# Patient Record
Sex: MALE | Employment: FULL TIME
[De-identification: names, ages, dates, MRNs, and addresses within clinical notes are randomized per-mention and may not be internally consistent; named-entity substitution may affect disease eponyms.]

---

## 2020-09-18 ENCOUNTER — EMPLOYEE WELLNESS (OUTPATIENT)
Dept: OTHER | Facility: CLINIC | Age: 25
End: 2020-09-18

## 2020-09-18 LAB
CHOLEST SERPL-MCNC: 246 MG/DL
GLUCOSE SERPL-MCNC: 68 MG/DL (ref 65–100)
HDLC SERPL-MCNC: 46 MG/DL
LDLC SERPL CALC-MCNC: 173 MG/DL (ref 0–100)
TRIGL SERPL-MCNC: 135 MG/DL (ref ?–150)

## 2020-09-28 ENCOUNTER — PATIENT OUTREACH (OUTPATIENT)
Dept: OTHER | Age: 25
End: 2020-09-28

## 2020-09-29 ENCOUNTER — PATIENT OUTREACH (OUTPATIENT)
Dept: OTHER | Age: 25
End: 2020-09-29

## 2020-09-29 NOTE — LETTER
9/29/2020 12:46 PM 
 
Mr. Keanu Robertson [address] [facility] [phone] Dear Keanu Robertson My name is Luz Pizarro, Associate Care Coordinator for [facility] and I have been trying to reach you. The Associate Care Management (ACM) program is a free-of-charge confidential service provided to our associates and their family members covered by the [facility]. The program will provide an associate and his/her family with the [facility] expertise to assist in navigating the health care delivery system, provider services, and their overall care needsso as to assure and improve health care interactions and enhance the quality of life. This program is designed to provide you with the opportunity to have a [facility] FOR [facility] partner with you for the following services: 
 
 1) when you come home from the hospital or emergency room 2) when help is needed to manage your disease 3) when you need assistance coordinating services or appointments 4) when you need additional education, resources or assistance reaching your Be Well Health Program goals/requirements such as Be Well With Diabetes [facility] is dedicated to empowering the good health of its community and improving the quality of care and care experiences for associates and their families. We are committed to safeguarding patient confidentiality and privacy, assuring that every associate has the respect he or she deserves in managing their health. The information shared with your care manager will not be shared with anyone else aside from those you identify as part of your care team, and will only be used to assist you with any identified care needs. Please contact me if you would like this service provided to you. Sincerely, 
 
 
Lacie Palacios LPN  [place] [facility] Care Coordinator [facility] 
[address], [address] [place] [address] C [phone]  F [phone]  [email or web address]

## 2020-09-29 NOTE — PROGRESS NOTES
Second telephone outreach attemptedto contact patient to offer adSage. Left discreet voicemail with this CM confidential contact information. Will send UTR letter via Mail. Next Outreach 10/13/20 f/u ? PC, CM Services.

## 2020-10-13 ENCOUNTER — PATIENT OUTREACH (OUTPATIENT)
Dept: OTHER | Age: 25
End: 2020-10-13

## 2020-10-13 NOTE — PROGRESS NOTES
Patient referred by Dr. Derick Lopez after Be Well screening results. Final call made today to attempt to contact patient. Left discreet message on voicemail with this CC contact information. Letter sent to patient notifying completion of services due to unable to reach.

## 2020-10-13 NOTE — LETTER
10/13/2020 9:44 AM 
 
Mr. Mery Smith [address] [facility] [phone] Dear Mery Smith My name is Malgorzata Montalvo,  Associate Care Coordinator for [facility] [place] [facility] AT [place], and I have been trying to reach you. The Associate Care Management (ACM) program is a free-of-charge, confidential service provided to our employees and their family members covered by the [facility]. I can help you with care transitions such as when you come home from the hospital, when help is needed to manage your disease, or when you need assistance coordinating services or appointments. As healthcare providers, we know that patients do better when they have close follow up with a primary care provider (PCP). I can help you find one that is convenient to you and covered by your insurance. I can also help you understand any after visit instructions, such as what symptoms to watch out for, or any new or changed medications. We can work together using your preferred communication -- telephone, email, [employer]. If you do not have a [employer] account, I can help you request access. Our program is designed to provide you with the opportunity to have a [facility] care manager partner with you for your healthcare needs. Due to not being able to reach you, I am closing out the current program, but will remain available to you should you have any questions. Please contact me at the below number if I can provide you with assistance for any of the above services. Sincerely, 
 
Brenda Joshi LPN  [place] [facility] Care Coordinator [facility] [place] [facility] AT [address], [address] [place] [address] C [phone]  F [phone]  [email or web address]

## 2021-01-08 ENCOUNTER — OFFICE VISIT (OUTPATIENT)
Dept: INTERNAL MEDICINE CLINIC | Age: 26
End: 2021-01-08
Payer: COMMERCIAL

## 2021-01-08 VITALS
HEART RATE: 78 BPM | OXYGEN SATURATION: 98 % | SYSTOLIC BLOOD PRESSURE: 96 MMHG | HEIGHT: 65 IN | BODY MASS INDEX: 28.29 KG/M2 | TEMPERATURE: 98.9 F | RESPIRATION RATE: 18 BRPM | WEIGHT: 169.8 LBS | DIASTOLIC BLOOD PRESSURE: 62 MMHG

## 2021-01-08 DIAGNOSIS — B20 HUMAN IMMUNODEFICIENCY VIRUS (HIV) DISEASE (HCC): Primary | ICD-10-CM

## 2021-01-08 PROCEDURE — 99204 OFFICE O/P NEW MOD 45 MIN: CPT | Performed by: INTERNAL MEDICINE

## 2021-01-08 RX ORDER — FLUOXETINE HYDROCHLORIDE 20 MG/1
CAPSULE ORAL
COMMUNITY
Start: 2020-10-25

## 2021-01-08 RX ORDER — GLUCOSAMINE/CHONDR SU A SOD 750-600 MG
TABLET ORAL
COMMUNITY

## 2021-01-08 RX ORDER — BISMUTH SUBSALICYLATE 262 MG
1 TABLET,CHEWABLE ORAL DAILY
COMMUNITY

## 2021-01-08 NOTE — PROGRESS NOTES
Infectious Disease Consult Note    Reason for Consult: PrEP  Date of Consultation: January 8, 2021  Date of Admission: (Not on file)  Referring Physician: patient self referred as he moved to [facility]       HPI:  Mehrdad Hampton is a 26y.o. year old male with history of SARS-CoV-2 infection in November 2020, depression, appendectomy who presents for discussion for PrEP. Patient states that he had been on Truvada for almost a year in the past. . He used to see a different infectious disease doctor in [place] at that [facility] where his PCP was. He reports moving to the area after college and wanting to establish care here. 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 Patient denies any symptoms today other than feeling a little achy status post Covid vaccine, first dose yesterday. Regards to sexual history, he reports having 3-4 male partners who are HIV negative. He denies any female partners. He admits to using protection with condoms. He does admit to engaging in anorectal intercourse and is a recipient. He denies any sexually transmitted diseases to his knowledge including HIV, syphilis, gonorrhea, chlamydia. He denies known history of hepatitis A,B or C. He denies any side effects or tolerance issues while taking Truvada.     He says he used to be compliant while taking Truvada as he also takes Prozac for depression        Past Medical History:  Past Medical History:   Diagnosis Date    Depression      COVID-19 November 2020    Surgical History:  Past Surgical History:   Procedure Laterality Date    HX APPENDECTOMY      HX HEENT           Family History:   Family History   Problem Relation Age of Onset    Diabetes Mother     No Known Problems Father          Social History:     · Living Situation: Works as a ER tech and also on EMS  · Tobacco: Denied cigarettes, admits to smoking marijuana  · Alcohol: Denied  · Illicit Drugs: Denied any other inhalation substance abuse or IV drug abuse  · Sexual History: Positive male partners, multiple  · Travel History denied  · Exposures:   · Outdoor/Hiking: denied  · Animal/Pet: Has a dog, cat, lizard and fish  · Construction: denied  · Hot Tub: denied   · Brackish/stagnant exposure: denied  · Known TB exposure: denied  Allergies: Allergies   Allergen Reactions    Keflex [Cephalexin] Rash    Rocephin [Ceftriaxone] Rash         Review of Systems:    10 point review of systems obtained and per HPI. All others negative. Medications:  Current Outpatient Medications on File Prior to Visit   Medication Sig Dispense Refill    FLUoxetine (PROzac) 20 mg capsule TAKE 1 CAPSULE BY MOUTH EVERY DAY      Biotin 2,500 mcg cap Take  by mouth.  multivitamin (ONE A DAY) tablet Take 1 Tab by mouth daily. No current facility-administered medications on file prior to visit.             Physical Exam:    · Vitals: Reviewed  · GEN: NAD  · HEENT: Normocephalic, atraumatic, PERRL, no scleral icterus,  no cervical, no lymphadenopathy, no sinus tenderness, no thrush  · CV: S1, S2 heard regularly, no murmurs, thrills or rubs heard   · Lungs: Clear to auscultation bilaterally  · Abdomen: soft, non distended, non tender, no organomegaly appreciated, no CVA tenderness   · Genitourinary: no genital discharge, no pruitt  · Extremities: no edema  · Neuro: Alert, oriented to time, place and situation, moves all extremities to commands, verbal   · Skin: no rash  · Psych: good affect, good eye contact, non tearful   · Musculoskeletal nontender to palpation of knees ankles wrists      Labs:   None available at this time review  Microbiology Data:   None available at this time to review    Pathology Results: None available at this time to review    Imaging: None available at this time to review        Assessment / Plan:       26y.o. year old male with history of SARS-CoV-2 infection in November 2020, depression, appendectomy who presents for discussion for PrEP.  Patient reports being on PrEP with Truvada in the past  His risk factor for HIV acquisition are multiple male partners  He reports using consistent condom use  Denies a history of sexually transmitted infection in the past    1) PREP   Discussed regarding routes and risk for HIV acquisition  Discussed regarding partner testing/disclosure notification  Discussed regarding options that include Truvada vs Descovy  Discussed regarding better renal and bone side effect profile with Descovy compared to Truvada  However Descovy has been associated (TAF-FTC) with mild but greater weight gain and changes in lipid parameters compared with TDF in Truvada  Will first need to assess labs to ensure renal function has GFR greater than 30 for either use of Truvada or Descovy  Discussed regarding compliance that is necessary and follow-up of labs and pill compliance  Patient is willing to have the following labs today and also have records sent from his previous infectious disease doctors office    Plan  Check CBC wt diff, CMP  Check HIV 4th gen and HIV PCR  Hep A, B C serologies  Chlamydia , gonorrhea PCR  Check RPR    Once above labs obtained and resulted, will discuss with patient regarding PrEP again to prescri depression history of Prozacbe    2) depression history on Prozac    3) Hx of COVID November 2020  Denies any respiratory symptoms today  Status post Covid vaccination ( 1st dose  yesterday per patient)    4) RTC 3 months

## 2021-01-08 NOTE — PROGRESS NOTES
Health Maintenance Due   Topic Date Due    DTaP/Tdap/Td series (1 - Tdap) 06/08/2017    Flu Vaccine (1) 09/01/2020       Chief Complaint   Patient presents with    New Patient     covid + november 2020       1. Have you been to the ER, urgent care clinic since your last visit? Hospitalized since your last visit? No    2. Have you seen or consulted any other health care providers outside of the [address] [place] [address] since your last visit? Include any pap smears or colon screening. No    3) Do you have an Advance Directive on file? no    4) Are you interested in receiving information on Advance Directives? NO      Patient is accompanied by self I have received verbal consent from Keanu Lizama to discuss any/all medical information while they are present in the room.

## 2021-01-10 LAB
ALBUMIN SERPL-MCNC: 4.5 G/DL (ref 4.1–5.2)
ALBUMIN/GLOB SERPL: 1.7 {RATIO} (ref 1.2–2.2)
ALP SERPL-CCNC: 60 IU/L (ref 39–117)
ALT SERPL-CCNC: 15 IU/L (ref 0–44)
AST SERPL-CCNC: 19 IU/L (ref 0–40)
BASOPHILS # BLD AUTO: 0 X10E3/UL (ref 0–0.2)
BASOPHILS NFR BLD AUTO: 1 %
BILIRUB SERPL-MCNC: 0.8 MG/DL (ref 0–1.2)
BUN SERPL-MCNC: 13 MG/DL (ref 6–20)
BUN/CREAT SERPL: 16 (ref 9–20)
C TRACH RRNA SPEC QL NAA+PROBE: NEGATIVE
CALCIUM SERPL-MCNC: 9.5 MG/DL (ref 8.7–10.2)
CHLORIDE SERPL-SCNC: 102 MMOL/L (ref 96–106)
CO2 SERPL-SCNC: 23 MMOL/L (ref 20–29)
CREAT SERPL-MCNC: 0.79 MG/DL (ref 0.76–1.27)
EOSINOPHIL # BLD AUTO: 0 X10E3/UL (ref 0–0.4)
EOSINOPHIL NFR BLD AUTO: 0 %
ERYTHROCYTE [DISTWIDTH] IN BLOOD BY AUTOMATED COUNT: 12.8 % (ref 11.6–15.4)
GLOBULIN SER CALC-MCNC: 2.7 G/DL (ref 1.5–4.5)
GLUCOSE SERPL-MCNC: 98 MG/DL (ref 65–99)
HAV AB SER QL IA: POSITIVE
HAV IGM SERPL QL IA: NEGATIVE
HBV CORE AB SERPL QL IA: NEGATIVE
HBV SURFACE AB SER QL: REACTIVE
HBV SURFACE AG SERPL QL IA: NEGATIVE
HCT VFR BLD AUTO: 43.9 % (ref 37.5–51)
HCV AB S/CO SERPL IA: <0.1 S/CO RATIO (ref 0–0.9)
HGB BLD-MCNC: 14.5 G/DL (ref 13–17.7)
HIV 1+2 AB+HIV1 P24 AG SERPL QL IA: NON REACTIVE
HIV1 RNA # SERPL NAA+PROBE: <20 COPIES/ML
HIV1 RNA SERPL NAA+PROBE-LOG#: NORMAL LOG10COPY/ML
IMM GRANULOCYTES # BLD AUTO: 0 X10E3/UL (ref 0–0.1)
IMM GRANULOCYTES NFR BLD AUTO: 0 %
LYMPHOCYTES # BLD AUTO: 0.5 X10E3/UL (ref 0.7–3.1)
LYMPHOCYTES NFR BLD AUTO: 7 %
MCH RBC QN AUTO: 30.9 PG (ref 26.6–33)
MCHC RBC AUTO-ENTMCNC: 33 G/DL (ref 31.5–35.7)
MCV RBC AUTO: 93 FL (ref 79–97)
MONOCYTES # BLD AUTO: 0.6 X10E3/UL (ref 0.1–0.9)
MONOCYTES NFR BLD AUTO: 7 %
N GONORRHOEA RRNA SPEC QL NAA+PROBE: NEGATIVE
NEUTROPHILS # BLD AUTO: 6.8 X10E3/UL (ref 1.4–7)
NEUTROPHILS NFR BLD AUTO: 85 %
PLATELET # BLD AUTO: 273 X10E3/UL (ref 150–450)
POTASSIUM SERPL-SCNC: 4.2 MMOL/L (ref 3.5–5.2)
PROT SERPL-MCNC: 7.2 G/DL (ref 6–8.5)
RBC # BLD AUTO: 4.7 X10E6/UL (ref 4.14–5.8)
RPR SER QL: NON REACTIVE
SODIUM SERPL-SCNC: 139 MMOL/L (ref 134–144)
WBC # BLD AUTO: 8 X10E3/UL (ref 3.4–10.8)

## 2021-01-14 ENCOUNTER — DOCUMENTATION ONLY (OUTPATIENT)
Dept: INFECTIOUS DISEASES | Age: 26
End: 2021-01-14

## 2021-01-14 NOTE — PROGRESS NOTES
ID addendum    Mr Praveena Curry dropped off some records that were reviewed and summarized below    The date of this testing is not available at present but papers he dropped off  [facility] and date printed 11/13/20    Cr 1.01. nim 11  Treponema pallidum IGG +IGM non reactive  HIv ab and ag EIA NR ( resulted 1/2/20)    Collected 12/27/19  C trachomatis RNA negative   GC RNA negative    Collected 2/28/20  HSV IGG +  HSV IGM negative

## 2021-02-17 DIAGNOSIS — Z79.899 ON PRE-EXPOSURE PROPHYLAXIS FOR HIV: Primary | ICD-10-CM

## 2021-02-17 RX ORDER — EMTRICITABINE AND TENOFOVIR DISOPROXIL FUMARATE 200; 300 MG/1; MG/1
1 TABLET, FILM COATED ORAL DAILY
Qty: 30 TAB | Refills: 3 | Status: SHIPPED | OUTPATIENT
Start: 2021-02-17

## 2021-02-17 NOTE — PROGRESS NOTES
D/W with Mr Sarah Haynes by phone today  He denies being sexually active or any symptoms since his last visit  Labs reviewed from January 2021    RPR nonreactive  Hepatitis C antibody less than 0.1  HIV fourth-generation screen nonreactive  Hepatitis B surface antibody reactive  Hepatitis A IgM negative and hepatitis A antibody total positive  Gonorrhea and Chlamydia negative PCR  HIV RNA by PCR less than 20  Hepatitis B core antibody total negative  Hepatitis B surface antigen negative  Creatinine 0.79 BUN 13     Discussed with Mr. Sarah Haynes regarding pros and cons of Descovy versus Truvada for preexposure prophylaxis    Discussed regarding potential of mild greater weight gain with Descovy and lipid changes compared to Truvada    Discussed regarding Truvada as potential of causing bone and renal side effects    Patient states he has been on Truvada before and done well    He is concerned more about the lipids and would like to restart Truvada for preexposure prophylaxis    Would still continue safe sex, condom use    Discussed regarding compliance necessary every 3 months for blood work as well as refills    Follow-up in 3 months    We will check a BMP in 2 weeks after starting Truvada    Counseled regarding potential side effects of Truvada and to call us if he experiences any side effects including GI, rash or any other adverse effects    Mera Stephenson,   12:15 PM

## 2021-03-03 DIAGNOSIS — Z79.899 ON PRE-EXPOSURE PROPHYLAXIS FOR HIV: ICD-10-CM

## 2021-03-04 LAB
BUN SERPL-MCNC: 14 MG/DL (ref 6–20)
BUN/CREAT SERPL: 16 (ref 9–20)
CALCIUM SERPL-MCNC: 9.7 MG/DL (ref 8.7–10.2)
CHLORIDE SERPL-SCNC: 103 MMOL/L (ref 96–106)
CO2 SERPL-SCNC: 22 MMOL/L (ref 20–29)
CREAT SERPL-MCNC: 0.87 MG/DL (ref 0.76–1.27)
GLUCOSE SERPL-MCNC: 92 MG/DL (ref 65–99)
POTASSIUM SERPL-SCNC: 4.2 MMOL/L (ref 3.5–5.2)
SODIUM SERPL-SCNC: 140 MMOL/L (ref 134–144)

## 2021-05-04 DIAGNOSIS — B20 HUMAN IMMUNODEFICIENCY VIRUS (HIV) DISEASE (HCC): Primary | ICD-10-CM

## 2021-05-04 RX ORDER — EMTRICITABINE AND TENOFOVIR DISOPROXIL FUMARATE 200; 300 MG/1; MG/1
1 TABLET, FILM COATED ORAL DAILY
Qty: 30 TAB | Refills: 3 | OUTPATIENT
Start: 2021-05-04

## 2021-05-04 NOTE — TELEPHONE ENCOUNTER
Requested Prescriptions     Pending Prescriptions Disp Refills    emtricitabine-tenofovir, TDF, (Truvada) 200-300 mg per tablet 30 Tab 3     Sig: Take 1 Tab by mouth daily. Patient has signed up for help with HIV meds. [facility].   01/08/2021 05/19/2021

## 2021-05-20 NOTE — TELEPHONE ENCOUNTER
Spoke with pt advising him of meds refusal due to  a follow up that is needed and labs every 3 months for prep. Pt had an appt on yesterday but cancelled due to him not having ins. anymore. Pt states he has been without meds since march and he can't afford to pay out of pocket due to not knowing how much his visit would cost.  Pt also advised me that he's moving out of [place] next month. I advised the pt that it's important that he takes his truvada . There was verbalizing understanding.

## 2021-05-21 ENCOUNTER — TELEPHONE (OUTPATIENT)
Dept: INTERNAL MEDICINE CLINIC | Age: 26
End: 2021-05-21

## 2021-05-21 NOTE — TELEPHONE ENCOUNTER
Spoke with pt regarding free clinic   ( [facility] and [facility] Per Dr. Dariana Gu) that can assist him with help with his doctor visits and meds due to him losing ins. And not being able to pay for anything due to funds. Pt advised me that he went to [facility] and was able to get his meds and that he doesn't need the clinic info for now but he was thankful for the info. There was verbalizing understanding.

## 2023-05-17 RX ORDER — EMTRICITABINE AND TENOFOVIR DISOPROXIL FUMARATE 200; 300 MG/1; MG/1
1 TABLET, FILM COATED ORAL DAILY
COMMUNITY
Start: 2021-02-17

## 2023-05-17 RX ORDER — FLUOXETINE HYDROCHLORIDE 20 MG/1
1 CAPSULE ORAL DAILY
COMMUNITY
Start: 2020-10-25